# Patient Record
Sex: FEMALE | Race: BLACK OR AFRICAN AMERICAN | Employment: FULL TIME | URBAN - METROPOLITAN AREA
[De-identification: names, ages, dates, MRNs, and addresses within clinical notes are randomized per-mention and may not be internally consistent; named-entity substitution may affect disease eponyms.]

---

## 2018-05-25 ENCOUNTER — HOSPITAL ENCOUNTER (EMERGENCY)
Age: 28
Discharge: HOME | End: 2018-05-25
Attending: EMERGENCY MEDICINE
Payer: COMMERCIAL

## 2018-05-25 VITALS
SYSTOLIC BLOOD PRESSURE: 137 MMHG | DIASTOLIC BLOOD PRESSURE: 73 MMHG | BODY MASS INDEX: 26.5 KG/M2 | OXYGEN SATURATION: 100 % | RESPIRATION RATE: 16 BRPM | TEMPERATURE: 98.5 F | HEART RATE: 72 BPM | WEIGHT: 144 LBS | HEIGHT: 62 IN

## 2018-05-25 DIAGNOSIS — T78.40XA ALLERGIC REACTION, INITIAL ENCOUNTER: Primary | ICD-10-CM

## 2018-05-25 PROCEDURE — 6370000000 HC RX 637 (ALT 250 FOR IP): Performed by: EMERGENCY MEDICINE

## 2018-05-25 PROCEDURE — 99283 EMERGENCY DEPT VISIT LOW MDM: CPT

## 2018-05-25 PROCEDURE — 96372 THER/PROPH/DIAG INJ SC/IM: CPT

## 2018-05-25 PROCEDURE — 6360000002 HC RX W HCPCS: Performed by: EMERGENCY MEDICINE

## 2018-05-25 RX ORDER — CALAMINE
1 LOTION (ML) TOPICAL 2 TIMES DAILY
Qty: 1 BOTTLE | Refills: 1 | Status: SHIPPED | OUTPATIENT
Start: 2018-05-25

## 2018-05-25 RX ORDER — DIPHENHYDRAMINE HCL 25 MG
25 TABLET ORAL ONCE
Status: COMPLETED | OUTPATIENT
Start: 2018-05-25 | End: 2018-05-25

## 2018-05-25 RX ORDER — DEXAMETHASONE SODIUM PHOSPHATE 10 MG/ML
10 INJECTION INTRAMUSCULAR; INTRAVENOUS ONCE
Status: COMPLETED | OUTPATIENT
Start: 2018-05-25 | End: 2018-05-25

## 2018-05-25 RX ORDER — EPINEPHRINE 0.3 MG/.3ML
0.3 INJECTION SUBCUTANEOUS
Qty: 2 EACH | Refills: 1 | Status: SHIPPED | OUTPATIENT
Start: 2018-05-25 | End: 2018-05-25

## 2018-05-25 RX ORDER — DIPHENHYDRAMINE HCL 25 MG
25 CAPSULE ORAL EVERY 6 HOURS PRN
Qty: 60 CAPSULE | Refills: 0 | Status: SHIPPED | OUTPATIENT
Start: 2018-05-25 | End: 2018-06-04

## 2018-05-25 RX ORDER — METHYLPREDNISOLONE 4 MG/1
TABLET ORAL
Qty: 1 KIT | Status: SHIPPED | OUTPATIENT
Start: 2018-05-25 | End: 2018-05-31

## 2018-05-25 RX ORDER — FAMOTIDINE 20 MG/1
20 TABLET, FILM COATED ORAL ONCE
Status: COMPLETED | OUTPATIENT
Start: 2018-05-25 | End: 2018-05-25

## 2018-05-25 RX ADMIN — FAMOTIDINE 20 MG: 20 TABLET, FILM COATED ORAL at 07:07

## 2018-05-25 RX ADMIN — DEXAMETHASONE SODIUM PHOSPHATE 10 MG: 10 INJECTION INTRAMUSCULAR; INTRAVENOUS at 07:08

## 2018-05-25 RX ADMIN — DIPHENHYDRAMINE HCL 25 MG: 25 TABLET ORAL at 07:07

## 2018-05-25 ASSESSMENT — ENCOUNTER SYMPTOMS
CONSTIPATION: 0
COUGH: 0
SORE THROAT: 0
ABDOMINAL PAIN: 0
COLOR CHANGE: 0
NAUSEA: 0
BLOOD IN STOOL: 0
VOMITING: 0
ALLERGIC REACTION: 1
DIARRHEA: 0
RHINORRHEA: 0
SHORTNESS OF BREATH: 0
BACK PAIN: 0

## 2018-05-27 ENCOUNTER — HOSPITAL ENCOUNTER (EMERGENCY)
Age: 28
Discharge: HOME | End: 2018-05-27
Attending: EMERGENCY MEDICINE
Payer: COMMERCIAL

## 2018-05-27 VITALS
HEIGHT: 62 IN | WEIGHT: 145 LBS | RESPIRATION RATE: 14 BRPM | HEART RATE: 82 BPM | DIASTOLIC BLOOD PRESSURE: 86 MMHG | OXYGEN SATURATION: 95 % | SYSTOLIC BLOOD PRESSURE: 119 MMHG | BODY MASS INDEX: 26.68 KG/M2 | TEMPERATURE: 98.8 F

## 2018-05-27 DIAGNOSIS — N89.8 VAGINAL ITCHING: Primary | ICD-10-CM

## 2018-05-27 LAB
BACTERIA: ABNORMAL /HPF
BILIRUBIN URINE: NEGATIVE
BLOOD, URINE: NEGATIVE
CHP ED QC CHECK: YES
CLARITY: ABNORMAL
CLUE CELLS: NORMAL
COLOR: YELLOW
GLUCOSE URINE: NEGATIVE MG/DL
KETONES, URINE: NEGATIVE MG/DL
LEUKOCYTE ESTERASE, URINE: NEGATIVE
MUCUS: PRESENT
NITRITE, URINE: NEGATIVE
PH UA: 6 (ref 5–9)
PREGNANCY TEST URINE, POC: NEGATIVE
PROTEIN UA: NEGATIVE MG/DL
RBC UA: ABNORMAL /HPF (ref 0–2)
SOURCE WET PREP: NORMAL
SPECIFIC GRAVITY UA: >=1.03 (ref 1–1.03)
TRICHOMONAS PREP: NORMAL
UROBILINOGEN, URINE: 0.2 E.U./DL
WBC UA: ABNORMAL /HPF (ref 0–5)
YEAST WET PREP: NORMAL

## 2018-05-27 PROCEDURE — 99283 EMERGENCY DEPT VISIT LOW MDM: CPT

## 2018-05-27 PROCEDURE — 87491 CHLMYD TRACH DNA AMP PROBE: CPT

## 2018-05-27 PROCEDURE — 87591 N.GONORRHOEAE DNA AMP PROB: CPT

## 2018-05-27 PROCEDURE — 87210 SMEAR WET MOUNT SALINE/INK: CPT

## 2018-05-27 PROCEDURE — 81001 URINALYSIS AUTO W/SCOPE: CPT

## 2018-05-29 LAB
CHLAMYDIA TRACHOMATIS AMPLIFIED DET: NORMAL
N GONORRHOEAE AMPLIFIED DET: NORMAL

## 2020-08-29 ENCOUNTER — HOSPITAL ENCOUNTER (EMERGENCY)
Age: 30
Discharge: HOME | End: 2020-08-30
Payer: COMMERCIAL

## 2020-08-29 ENCOUNTER — APPOINTMENT (OUTPATIENT)
Dept: CT IMAGING | Age: 30
End: 2020-08-29
Payer: COMMERCIAL

## 2020-08-29 LAB
BASOPHILS ABSOLUTE: 0.06 E9/L (ref 0–0.2)
BASOPHILS RELATIVE PERCENT: 1 % (ref 0–2)
EOSINOPHILS ABSOLUTE: 0.12 E9/L (ref 0.05–0.5)
EOSINOPHILS RELATIVE PERCENT: 2 % (ref 0–6)
HCG, URINE, POC: NEGATIVE
HCT VFR BLD CALC: 38.2 % (ref 34–48)
HEMOGLOBIN: 12.7 G/DL (ref 11.5–15.5)
IMMATURE GRANULOCYTES #: 0.01 E9/L
IMMATURE GRANULOCYTES %: 0.2 % (ref 0–5)
LYMPHOCYTES ABSOLUTE: 2.92 E9/L (ref 1.5–4)
LYMPHOCYTES RELATIVE PERCENT: 48.4 % (ref 20–42)
Lab: NORMAL
MCH RBC QN AUTO: 31 PG (ref 26–35)
MCHC RBC AUTO-ENTMCNC: 33.2 % (ref 32–34.5)
MCV RBC AUTO: 93.2 FL (ref 80–99.9)
MONOCYTES ABSOLUTE: 0.78 E9/L (ref 0.1–0.95)
MONOCYTES RELATIVE PERCENT: 12.9 % (ref 2–12)
NEGATIVE QC PASS/FAIL: NORMAL
NEUTROPHILS ABSOLUTE: 2.14 E9/L (ref 1.8–7.3)
NEUTROPHILS RELATIVE PERCENT: 35.5 % (ref 43–80)
PDW BLD-RTO: 12.4 FL (ref 11.5–15)
PLATELET # BLD: 231 E9/L (ref 130–450)
PMV BLD AUTO: 11.5 FL (ref 7–12)
POSITIVE QC PASS/FAIL: NORMAL
RBC # BLD: 4.1 E12/L (ref 3.5–5.5)
WBC # BLD: 6 E9/L (ref 4.5–11.5)

## 2020-08-29 PROCEDURE — 80053 COMPREHEN METABOLIC PANEL: CPT

## 2020-08-29 PROCEDURE — 36415 COLL VENOUS BLD VENIPUNCTURE: CPT

## 2020-08-29 PROCEDURE — 96374 THER/PROPH/DIAG INJ IV PUSH: CPT

## 2020-08-29 PROCEDURE — 85025 COMPLETE CBC W/AUTO DIFF WBC: CPT

## 2020-08-29 PROCEDURE — 96375 TX/PRO/DX INJ NEW DRUG ADDON: CPT

## 2020-08-29 PROCEDURE — 83690 ASSAY OF LIPASE: CPT

## 2020-08-29 PROCEDURE — 74176 CT ABD & PELVIS W/O CONTRAST: CPT

## 2020-08-29 PROCEDURE — 99284 EMERGENCY DEPT VISIT MOD MDM: CPT

## 2020-08-29 PROCEDURE — 81003 URINALYSIS AUTO W/O SCOPE: CPT

## 2020-08-29 PROCEDURE — 83605 ASSAY OF LACTIC ACID: CPT

## 2020-08-29 PROCEDURE — 96361 HYDRATE IV INFUSION ADD-ON: CPT

## 2020-08-29 PROCEDURE — 87088 URINE BACTERIA CULTURE: CPT

## 2020-08-29 PROCEDURE — 6360000002 HC RX W HCPCS

## 2020-08-29 RX ORDER — ONDANSETRON 2 MG/ML
INJECTION INTRAMUSCULAR; INTRAVENOUS
Status: COMPLETED
Start: 2020-08-29 | End: 2020-08-29

## 2020-08-29 RX ORDER — ONDANSETRON 2 MG/ML
4 INJECTION INTRAMUSCULAR; INTRAVENOUS ONCE
Status: COMPLETED | OUTPATIENT
Start: 2020-08-29 | End: 2020-08-29

## 2020-08-29 RX ORDER — MORPHINE SULFATE 4 MG/ML
4 INJECTION, SOLUTION INTRAMUSCULAR; INTRAVENOUS ONCE
Status: COMPLETED | OUTPATIENT
Start: 2020-08-29 | End: 2020-08-29

## 2020-08-29 RX ORDER — MORPHINE SULFATE 4 MG/ML
INJECTION, SOLUTION INTRAMUSCULAR; INTRAVENOUS
Status: COMPLETED
Start: 2020-08-29 | End: 2020-08-29

## 2020-08-29 RX ORDER — KETOROLAC TROMETHAMINE 30 MG/ML
30 INJECTION, SOLUTION INTRAMUSCULAR; INTRAVENOUS ONCE
Status: COMPLETED | OUTPATIENT
Start: 2020-08-29 | End: 2020-08-29

## 2020-08-29 RX ORDER — KETOROLAC TROMETHAMINE 30 MG/ML
INJECTION, SOLUTION INTRAMUSCULAR; INTRAVENOUS
Status: COMPLETED
Start: 2020-08-29 | End: 2020-08-29

## 2020-08-29 RX ADMIN — MORPHINE SULFATE 4 MG: 4 INJECTION, SOLUTION INTRAMUSCULAR; INTRAVENOUS at 23:27

## 2020-08-29 RX ADMIN — ONDANSETRON 4 MG: 2 INJECTION INTRAMUSCULAR; INTRAVENOUS at 23:27

## 2020-08-29 RX ADMIN — KETOROLAC TROMETHAMINE 30 MG: 30 INJECTION, SOLUTION INTRAMUSCULAR; INTRAVENOUS at 23:27

## 2020-08-29 RX ADMIN — KETOROLAC TROMETHAMINE 30 MG: 30 INJECTION, SOLUTION INTRAMUSCULAR at 23:27

## 2020-08-29 ASSESSMENT — PAIN SCALES - GENERAL
PAINLEVEL_OUTOF10: 10
PAINLEVEL_OUTOF10: 10

## 2020-08-29 ASSESSMENT — PAIN DESCRIPTION - LOCATION: LOCATION: BACK

## 2020-08-30 VITALS
WEIGHT: 140 LBS | RESPIRATION RATE: 14 BRPM | OXYGEN SATURATION: 99 % | DIASTOLIC BLOOD PRESSURE: 80 MMHG | BODY MASS INDEX: 25.76 KG/M2 | TEMPERATURE: 98.2 F | HEART RATE: 84 BPM | SYSTOLIC BLOOD PRESSURE: 133 MMHG | HEIGHT: 62 IN

## 2020-08-30 LAB
ALBUMIN SERPL-MCNC: 4.3 G/DL (ref 3.5–5.2)
ALP BLD-CCNC: 64 U/L (ref 35–104)
ALT SERPL-CCNC: 19 U/L (ref 0–32)
ANION GAP SERPL CALCULATED.3IONS-SCNC: 11 MMOL/L (ref 7–16)
AST SERPL-CCNC: 20 U/L (ref 0–31)
BILIRUB SERPL-MCNC: 0.3 MG/DL (ref 0–1.2)
BILIRUBIN URINE: NEGATIVE
BLOOD, URINE: NEGATIVE
BUN BLDV-MCNC: 12 MG/DL (ref 6–20)
CALCIUM SERPL-MCNC: 9.8 MG/DL (ref 8.6–10.2)
CHLORIDE BLD-SCNC: 103 MMOL/L (ref 98–107)
CLARITY: CLEAR
CO2: 23 MMOL/L (ref 22–29)
COLOR: YELLOW
CREAT SERPL-MCNC: 1.1 MG/DL (ref 0.5–1)
GFR AFRICAN AMERICAN: >60
GFR NON-AFRICAN AMERICAN: >60 ML/MIN/1.73
GLUCOSE BLD-MCNC: 106 MG/DL (ref 74–99)
GLUCOSE URINE: NEGATIVE MG/DL
KETONES, URINE: NEGATIVE MG/DL
LACTIC ACID: 1.2 MMOL/L (ref 0.5–2.2)
LACTIC ACID: 2.8 MMOL/L (ref 0.5–2.2)
LEUKOCYTE ESTERASE, URINE: NEGATIVE
LIPASE: 18 U/L (ref 13–60)
NITRITE, URINE: NEGATIVE
PH UA: 6 (ref 5–9)
POTASSIUM SERPL-SCNC: 3.7 MMOL/L (ref 3.5–5)
PROTEIN UA: NEGATIVE MG/DL
SODIUM BLD-SCNC: 137 MMOL/L (ref 132–146)
SPECIFIC GRAVITY UA: 1.02 (ref 1–1.03)
TOTAL PROTEIN: 7.5 G/DL (ref 6.4–8.3)
UROBILINOGEN, URINE: 0.2 E.U./DL

## 2020-08-30 PROCEDURE — 2580000003 HC RX 258: Performed by: PHYSICIAN ASSISTANT

## 2020-08-30 PROCEDURE — 36415 COLL VENOUS BLD VENIPUNCTURE: CPT

## 2020-08-30 PROCEDURE — 6370000000 HC RX 637 (ALT 250 FOR IP): Performed by: PHYSICIAN ASSISTANT

## 2020-08-30 PROCEDURE — 83605 ASSAY OF LACTIC ACID: CPT

## 2020-08-30 RX ORDER — OXYCODONE HYDROCHLORIDE AND ACETAMINOPHEN 5; 325 MG/1; MG/1
1 TABLET ORAL EVERY 6 HOURS PRN
Qty: 12 TABLET | Refills: 0 | Status: SHIPPED | OUTPATIENT
Start: 2020-08-30 | End: 2020-09-04

## 2020-08-30 RX ORDER — METRONIDAZOLE 500 MG/1
500 TABLET ORAL ONCE
Status: DISCONTINUED | OUTPATIENT
Start: 2020-08-30 | End: 2020-08-30

## 2020-08-30 RX ORDER — OXYCODONE HYDROCHLORIDE AND ACETAMINOPHEN 5; 325 MG/1; MG/1
1 TABLET ORAL ONCE
Status: COMPLETED | OUTPATIENT
Start: 2020-08-30 | End: 2020-08-30

## 2020-08-30 RX ORDER — AZITHROMYCIN 250 MG/1
1000 TABLET, FILM COATED ORAL ONCE
Status: DISCONTINUED | OUTPATIENT
Start: 2020-08-30 | End: 2020-08-30

## 2020-08-30 RX ORDER — ONDANSETRON 4 MG/1
4 TABLET, ORALLY DISINTEGRATING ORAL EVERY 8 HOURS PRN
Qty: 10 TABLET | Refills: 0 | Status: SHIPPED | OUTPATIENT
Start: 2020-08-30

## 2020-08-30 RX ORDER — 0.9 % SODIUM CHLORIDE 0.9 %
1000 INTRAVENOUS SOLUTION INTRAVENOUS ONCE
Status: COMPLETED | OUTPATIENT
Start: 2020-08-30 | End: 2020-08-30

## 2020-08-30 RX ORDER — IBUPROFEN 600 MG/1
600 TABLET ORAL 4 TIMES DAILY PRN
Qty: 360 TABLET | Refills: 1 | Status: SHIPPED | OUTPATIENT
Start: 2020-08-30

## 2020-08-30 RX ADMIN — OXYCODONE HYDROCHLORIDE AND ACETAMINOPHEN 1 TABLET: 5; 325 TABLET ORAL at 02:06

## 2020-08-30 RX ADMIN — SODIUM CHLORIDE 1000 ML: 9 INJECTION, SOLUTION INTRAVENOUS at 00:35

## 2020-08-30 ASSESSMENT — PAIN SCALES - GENERAL
PAINLEVEL_OUTOF10: 6
PAINLEVEL_OUTOF10: 6

## 2020-08-30 NOTE — ED PROVIDER NOTES
[facility]  HPI:  8/29/20, Time: 11:05 PM EDT         Alejandra Ferrer is a 31 y.o. female presenting to the ED for  Left flank pain , beginning 2130 . The complaint has been persistent, severe in severity, and worsened by nothing. patient comes in with complaint of left flank pain radiating to the left abdomen. She states pain started around 930. She has had nausea no vomiting denies any diarrhea constipation no urinary frequency urgency or burning. No vaginal discharge. She says she has a history of a ovarian cyst or mass on the left. She is following with OB/GYN at this time. Review of Systems:   Pertinent positives and negatives are stated within HPI, all other systems reviewed and are negative.          --------------------------------------------- PAST HISTORY ---------------------------------------------  Past Medical History:  has a past medical history of Allergic reaction, Anxiety, Asthma, Depression, and MRSA (methicillin resistant staph aureus) culture positive. Past Surgical History:  has no past surgical history on file. Social History:  reports that she has never smoked. She has never used smokeless tobacco. She reports that she does not drink alcohol or use drugs. Family History: family history is not on file. The patients home medications have been reviewed. Allergies: Rocephin [ceftriaxone sodium in dextrose];  Lidocaine; Sulfa antibiotics; and Ampicillin    -------------------------------------------------- RESULTS -------------------------------------------------  All laboratory and radiology results have been personally reviewed by myself   LABS:  Results for orders placed or performed during the hospital encounter of 08/29/20   Urinalysis   Result Value Ref Range    Color, UA Yellow Straw/Yellow    Clarity, UA Clear Clear    Glucose, Ur Negative Negative mg/dL    Bilirubin Urine Negative Negative    Ketones, Urine Negative Negative mg/dL    Specific [place], UA 1.025 1.005 - 1.030    Blood, Urine Negative Negative    pH, UA 6.0 5.0 - 9.0    Protein, UA Negative Negative mg/dL    Urobilinogen, Urine 0.2 <2.0 E.U./dL    Nitrite, Urine Negative Negative    Leukocyte Esterase, Urine Negative Negative   Lactic Acid, Plasma   Result Value Ref Range    Lactic Acid 2.8 (H) 0.5 - 2.2 mmol/L   CBC Auto Differential   Result Value Ref Range    WBC 6.0 4.5 - 11.5 E9/L    RBC 4.10 3.50 - 5.50 E12/L    Hemoglobin 12.7 11.5 - 15.5 g/dL    Hematocrit 38.2 34.0 - 48.0 %    MCV 93.2 80.0 - 99.9 fL    MCH 31.0 26.0 - 35.0 pg    MCHC 33.2 32.0 - 34.5 %    RDW 12.4 11.5 - 15.0 fL    Platelets 904 980 - 539 E9/L    MPV 11.5 7.0 - 12.0 fL    Neutrophils % 35.5 (L) 43.0 - 80.0 %    Immature Granulocytes % 0.2 0.0 - 5.0 %    Lymphocytes % 48.4 (H) 20.0 - 42.0 %    Monocytes % 12.9 (H) 2.0 - 12.0 %    Eosinophils % 2.0 0.0 - 6.0 %    Basophils % 1.0 0.0 - 2.0 %    Neutrophils Absolute 2.14 1.80 - 7.30 E9/L    Immature Granulocytes # 0.01 E9/L    Lymphocytes Absolute 2.92 1.50 - 4.00 E9/L    Monocytes Absolute 0.78 0.10 - 0.95 E9/L    Eosinophils Absolute 0.12 0.05 - 0.50 E9/L    Basophils Absolute 0.06 0.00 - 0.20 E9/L   Comprehensive Metabolic Panel   Result Value Ref Range    Sodium 137 132 - 146 mmol/L    Potassium 3.7 3.5 - 5.0 mmol/L    Chloride 103 98 - 107 mmol/L    CO2 23 22 - 29 mmol/L    Anion Gap 11 7 - 16 mmol/L    Glucose 106 (H) 74 - 99 mg/dL    BUN 12 6 - 20 mg/dL    CREATININE 1.1 (H) 0.5 - 1.0 mg/dL    GFR Non-African American >60 >=60 mL/min/1.73    GFR African American >60     Calcium 9.8 8.6 - 10.2 mg/dL    Total Protein 7.5 6.4 - 8.3 g/dL    Alb 4.3 3.5 - 5.2 g/dL    Total Bilirubin 0.3 0.0 - 1.2 mg/dL    Alkaline Phosphatase 64 35 - 104 U/L    ALT 19 0 - 32 U/L    AST 20 0 - 31 U/L   Lipase   Result Value Ref Range    Lipase 18 13 - 60 U/L   Lactic Acid, Plasma   Result Value Ref Range    Lactic Acid 1.2 0.5 - 2.2 mmol/L   POC Pregnancy Urine Qual   Result Value Ref Range HCG, Urine, POC Negative Negative    Lot Number [id number]     Positive QC Pass/Fail Pass     Negative QC Pass/Fail Pass        RADIOLOGY:  Interpreted by Radiologist.  CT ABDOMEN PELVIS WO CONTRAST   Final Result   1. The appendix is identified and appears with unremarkable appearance   although calcified appendicolith is seen. 2. Some free fluid accumulation in the cul-de-sac. The uterus is in   retroverted in position. There is a calcified dermoid lesion in the   left adnexa. Can further evaluate with pelvic ultrasound. 3. There is a tiny calcification outside of the base of the bladder,   on the left side, the bladder is empty. Cannot separate a calculus in   the distal left ureter which is presently is not causing obstructive   uropathy versus a calcified phlebolith in the pelvic floor cavity. 4. Signs for medullary nephrocalcinosis with a 1 mm nonobstructing   calculus in the right kidney. No obstructive uropathy in the right or   on the left kidneys. ------------------------- NURSING NOTES AND VITALS REVIEWED ---------------------------   The nursing notes within the ED encounter and vital signs as below have been reviewed. /80   Pulse 84   Temp 98.2 °F (36.8 °C) (Oral)   Resp 14   Ht 5' 2\" (1.575 m)   Wt 140 lb (63.5 kg)   SpO2 99%   BMI 25.61 kg/m²   Oxygen Saturation Interpretation: Normal      ---------------------------------------------------PHYSICAL EXAM--------------------------------------      Constitutional/General: Alert and oriented x3, well appearing, non toxic in NAD  Head: Normocephalic and atraumatic  Eyes: PERRL, EOMI  Mouth: Oropharynx clear, handling secretions, no trismus  Neck: Supple, full ROM,   Pulmonary: Lungs clear to auscultation bilaterally, no wheezes, rales, or rhonchi. Not in respiratory distress  Cardiovascular:  Regular rate and rhythm, no murmurs, gallops, or rubs.  2+ distal pulses  Abdomen: Soft, generalized abdominal tenderness non distended, guarding left CVA tenderness  Extremities: Moves all extremities x 4. Warm and well perfused  Skin: warm and dry without rash  Neurologic: GCS 15,  Psych: Normal Affect      ------------------------------ ED COURSE/MEDICAL DECISION MAKING----------------------  Medications   ketorolac (TORADOL) injection 30 mg (30 mg Intravenous Given 8/29/20 2327)   morphine sulfate (PF) injection 4 mg (4 mg Intravenous Given 8/29/20 2327)   ondansetron (ZOFRAN) injection 4 mg (4 mg Intravenous Given 8/29/20 2327)   0.9 % sodium chloride bolus (0 mLs Intravenous Stopped 8/30/20 0143)   oxyCODONE-acetaminophen (PERCOCET) 5-325 MG per tablet 1 tablet (1 tablet Oral Given 8/30/20 0206)         ED COURSE:   0020 patient states pain nausea has improved    Medical Decision Making:    Patient comes in with complaint of left flank pain lower abdominal pain that came on suddenly around 2130. CT showing possible left-sided ureteral stone. Should there is also noted dermal adnexal cyst with some free fluid likely ruptured ovarian cyst.  Elevated lactic acid she was hydrated with liter of fluid. Patient was given referral to urology for kidney stone she is to follow with her OB/GYN regarding the dermoid ovarian cyst.  Patient was discharged with Percocet Zofran and Motrin. Counseling: The emergency provider has spoken with the patient and discussed todays results, in addition to providing specific details for the plan of care and counseling regarding the diagnosis and prognosis. Questions are answered at this time and they are agreeable with the plan.      --------------------------------- IMPRESSION AND DISPOSITION ---------------------------------    IMPRESSION  1. Kidney stone    2. Cyst of ovary, unspecified laterality        DISPOSITION  Disposition: Discharge to home  Patient condition is good      NOTE: This report was transcribed using voice recognition software.  Every effort was made to ensure accuracy;

## 2020-08-31 LAB — URINE CULTURE, ROUTINE: NORMAL
